# Patient Record
Sex: FEMALE | Race: WHITE | NOT HISPANIC OR LATINO | Employment: STUDENT | URBAN - METROPOLITAN AREA
[De-identification: names, ages, dates, MRNs, and addresses within clinical notes are randomized per-mention and may not be internally consistent; named-entity substitution may affect disease eponyms.]

---

## 2023-12-20 ENCOUNTER — OFFICE VISIT (OUTPATIENT)
Dept: PRIMARY CARE | Facility: CLINIC | Age: 18
End: 2023-12-20
Payer: MEDICAID

## 2023-12-20 VITALS
HEIGHT: 67 IN | TEMPERATURE: 97.3 F | SYSTOLIC BLOOD PRESSURE: 110 MMHG | DIASTOLIC BLOOD PRESSURE: 73 MMHG | OXYGEN SATURATION: 99 % | WEIGHT: 126 LBS | BODY MASS INDEX: 19.78 KG/M2 | HEART RATE: 87 BPM

## 2023-12-20 DIAGNOSIS — Z86.59 HISTORY OF DEPRESSION: ICD-10-CM

## 2023-12-20 DIAGNOSIS — F41.9 ANXIETY: ICD-10-CM

## 2023-12-20 DIAGNOSIS — R53.83 FATIGUE, UNSPECIFIED TYPE: ICD-10-CM

## 2023-12-20 DIAGNOSIS — R00.2 HEART PALPITATIONS: Primary | ICD-10-CM

## 2023-12-20 PROCEDURE — 99203 OFFICE O/P NEW LOW 30 MIN: CPT | Performed by: STUDENT IN AN ORGANIZED HEALTH CARE EDUCATION/TRAINING PROGRAM

## 2023-12-20 PROCEDURE — 4004F PT TOBACCO SCREEN RCVD TLK: CPT | Performed by: STUDENT IN AN ORGANIZED HEALTH CARE EDUCATION/TRAINING PROGRAM

## 2023-12-20 ASSESSMENT — ENCOUNTER SYMPTOMS
UNEXPECTED WEIGHT CHANGE: 0
SHORTNESS OF BREATH: 0
ABDOMINAL PAIN: 0
CONFUSION: 0
FEVER: 0
CHILLS: 0
FATIGUE: 1
CHEST TIGHTNESS: 1
DIARRHEA: 0
COLOR CHANGE: 0
NAUSEA: 0
VOMITING: 0
DIZZINESS: 0
COUGH: 0
WHEEZING: 0
PALPITATIONS: 1
CONSTIPATION: 0
MUSCULOSKELETAL NEGATIVE: 1
HEADACHES: 0

## 2023-12-20 NOTE — PROGRESS NOTES
"Subjective   Patient ID: Patricia Pérez is a 18 y.o. female who presents for New Patient Visit ([facility] ).    HPI   She is new pt here to [facility] and also with few concerns. She is a std at [employer] and also work at [employer]. Reports she is having fast heart beat usually when she is standing, some chest discomfort; reports fatigue to quickly; feels some dizziness as well x 1-2 mo. Denies having diarrhea, skin issues; denies fh/o thyroid disease; reports some h/o anxiety but not bad. Reports h/o depression & insomnia and prev taking trazodone & prozac; last taken few yrs ago, 2021. Reports she tried diff doses of prozac w/o much help.     Review of Systems   Constitutional:  Positive for fatigue. Negative for chills, fever and unexpected weight change.   HENT: Negative.     Respiratory:  Positive for chest tightness. Negative for cough, shortness of breath and wheezing.    Cardiovascular:  Positive for palpitations. Negative for chest pain.   Gastrointestinal:  Negative for abdominal pain, constipation, diarrhea, nausea and vomiting.   Musculoskeletal: Negative.    Skin:  Negative for color change and rash.   Neurological:  Negative for dizziness and headaches.   Psychiatric/Behavioral:  Negative for behavioral problems and confusion.        Objective   /73 (BP Location: Left arm, Patient Position: Sitting, BP Cuff Size: Small adult)   Pulse 87   Temp 36.3 °C (97.3 °F)   Ht 1.702 m (5' 7\")   Wt 57.2 kg (126 lb)   LMP 11/29/2023 (Approximate)   SpO2 99%   BMI 19.73 kg/m²     Physical Exam  Vitals and nursing note reviewed.   Constitutional:       Appearance: Normal appearance.   Eyes:      Extraocular Movements: Extraocular movements intact.      Pupils: Pupils are equal, round, and reactive to light.   Cardiovascular:      Rate and Rhythm: Normal rate and regular rhythm.      Pulses: Normal pulses.      Heart sounds: Normal heart sounds.   Pulmonary:      Effort: Pulmonary effort is normal. No " respiratory distress.      Breath sounds: Normal breath sounds.   Abdominal:      General: Abdomen is flat. Bowel sounds are normal.      Palpations: Abdomen is soft.   Musculoskeletal:         General: Normal range of motion.   Neurological:      General: No focal deficit present.      Mental Status: She is alert and oriented to person, place, and time.      Cranial Nerves: No cranial nerve deficit.      Sensory: No sensory deficit.      Motor: No weakness.   Psychiatric:         Mood and Affect: Mood normal.         Behavior: Behavior normal.       Assessment/Plan   She is new pt here to [facility] care and also with few concerns. She is a std at [employer] (w/ [employer]) and also work at [employer]. She is having intermittent palpitations, generalized fatigue on & off for over few months, could be anxiety related based on hx vs other as metabolic vs other as cardiac; will put an order for BW to eval metabolic and also order holter monitor to be obtained if BW comes normal. Adv home relaxation & mindfulness activities; may consider adding anxiety/depression meds as indicated at NOV. She is otherwise clinically & vitally stable. Seek ER care if sx worsens.   Problem List Items Addressed This Visit    None  Visit Diagnoses         Codes    Heart palpitations    -  Primary R00.2    Relevant Orders    Comprehensive Metabolic Panel    CBC and Auto Differential    Tsh With Reflex To Free T4 If Abnormal    Vitamin B12    Holter or Event Cardiac Monitor    Fatigue, unspecified type     R53.83    Relevant Orders    Comprehensive Metabolic Panel    CBC and Auto Differential    Tsh With Reflex To Free T4 If Abnormal    Vitamin B12    Holter or Event Cardiac Monitor    Anxiety     F41.9    History of depression     Z86.59          Rtc 2-3 mo for BIANCA Quiñones MD   [facility], Family Medicine

## 2023-12-21 ENCOUNTER — LAB (OUTPATIENT)
Dept: LAB | Facility: LAB | Age: 18
End: 2023-12-21
Payer: MEDICAID

## 2023-12-21 DIAGNOSIS — R53.83 FATIGUE, UNSPECIFIED TYPE: ICD-10-CM

## 2023-12-21 DIAGNOSIS — R00.2 HEART PALPITATIONS: ICD-10-CM

## 2023-12-21 LAB
ALBUMIN SERPL BCP-MCNC: 4.6 G/DL (ref 3.4–5)
ALP SERPL-CCNC: 64 U/L (ref 33–110)
ALT SERPL W P-5'-P-CCNC: 15 U/L (ref 7–45)
ANION GAP SERPL CALC-SCNC: 8 MMOL/L (ref 10–20)
AST SERPL W P-5'-P-CCNC: 16 U/L (ref 9–39)
BASOPHILS # BLD AUTO: 0.03 X10*3/UL (ref 0–0.1)
BASOPHILS NFR BLD AUTO: 0.2 %
BILIRUB SERPL-MCNC: 0.6 MG/DL (ref 0–1.2)
BUN SERPL-MCNC: 13 MG/DL (ref 6–23)
CALCIUM SERPL-MCNC: 9.5 MG/DL (ref 8.6–10.3)
CHLORIDE SERPL-SCNC: 103 MMOL/L (ref 98–107)
CO2 SERPL-SCNC: 30 MMOL/L (ref 21–32)
CREAT SERPL-MCNC: 0.6 MG/DL (ref 0.5–1.05)
EOSINOPHIL # BLD AUTO: 0.12 X10*3/UL (ref 0–0.7)
EOSINOPHIL NFR BLD AUTO: 0.9 %
ERYTHROCYTE [DISTWIDTH] IN BLOOD BY AUTOMATED COUNT: 12.4 % (ref 11.5–14.5)
GFR SERPL CREATININE-BSD FRML MDRD: >90 ML/MIN/1.73M*2
GLUCOSE SERPL-MCNC: 76 MG/DL (ref 74–99)
HCT VFR BLD AUTO: 44.2 % (ref 36–46)
HGB BLD-MCNC: 14.4 G/DL (ref 12–16)
IMM GRANULOCYTES # BLD AUTO: 0.03 X10*3/UL (ref 0–0.7)
IMM GRANULOCYTES NFR BLD AUTO: 0.2 % (ref 0–0.9)
LYMPHOCYTES # BLD AUTO: 1.63 X10*3/UL (ref 1.2–4.8)
LYMPHOCYTES NFR BLD AUTO: 12.5 %
MCH RBC QN AUTO: 30.6 PG (ref 26–34)
MCHC RBC AUTO-ENTMCNC: 32.6 G/DL (ref 32–36)
MCV RBC AUTO: 94 FL (ref 80–100)
MONOCYTES # BLD AUTO: 0.67 X10*3/UL (ref 0.1–1)
MONOCYTES NFR BLD AUTO: 5.1 %
NEUTROPHILS # BLD AUTO: 10.58 X10*3/UL (ref 1.2–7.7)
NEUTROPHILS NFR BLD AUTO: 81.1 %
NRBC BLD-RTO: 0 /100 WBCS (ref 0–0)
PLATELET # BLD AUTO: 273 X10*3/UL (ref 150–450)
POTASSIUM SERPL-SCNC: 4.3 MMOL/L (ref 3.5–5.3)
PROT SERPL-MCNC: 7 G/DL (ref 6.4–8.2)
RBC # BLD AUTO: 4.7 X10*6/UL (ref 4–5.2)
SODIUM SERPL-SCNC: 137 MMOL/L (ref 136–145)
TSH SERPL-ACNC: 0.91 MIU/L (ref 0.44–3.98)
VIT B12 SERPL-MCNC: 297 PG/ML (ref 211–911)
WBC # BLD AUTO: 13.1 X10*3/UL (ref 4.4–11.3)

## 2023-12-21 PROCEDURE — 80053 COMPREHEN METABOLIC PANEL: CPT

## 2023-12-21 PROCEDURE — 85025 COMPLETE CBC W/AUTO DIFF WBC: CPT

## 2023-12-21 PROCEDURE — 82607 VITAMIN B-12: CPT

## 2023-12-21 PROCEDURE — 36415 COLL VENOUS BLD VENIPUNCTURE: CPT

## 2023-12-21 PROCEDURE — 84443 ASSAY THYROID STIM HORMONE: CPT

## 2023-12-26 ENCOUNTER — TELEPHONE (OUTPATIENT)
Dept: PRIMARY CARE | Facility: CLINIC | Age: 18
End: 2023-12-26
Payer: MEDICAID

## 2023-12-26 DIAGNOSIS — D72.829 LEUKOCYTOSIS, UNSPECIFIED TYPE: Primary | ICD-10-CM

## 2024-01-08 ENCOUNTER — HOSPITAL ENCOUNTER (OUTPATIENT)
Dept: CARDIOLOGY | Facility: CLINIC | Age: 19
Discharge: HOME | End: 2024-01-08
Payer: MEDICAID

## 2024-01-08 DIAGNOSIS — R53.83 FATIGUE, UNSPECIFIED TYPE: ICD-10-CM

## 2024-01-08 DIAGNOSIS — R00.2 HEART PALPITATIONS: ICD-10-CM

## 2024-01-08 PROCEDURE — 93244 EXT ECG>48HR<7D REV&INTERPJ: CPT | Performed by: INTERNAL MEDICINE

## 2024-01-08 PROCEDURE — 93242 EXT ECG>48HR<7D RECORDING: CPT

## 2024-02-26 ENCOUNTER — OFFICE VISIT (OUTPATIENT)
Dept: PRIMARY CARE | Facility: CLINIC | Age: 19
End: 2024-02-26
Payer: MEDICAID

## 2024-02-26 VITALS
HEART RATE: 97 BPM | TEMPERATURE: 96.2 F | RESPIRATION RATE: 16 BRPM | HEIGHT: 67 IN | BODY MASS INDEX: 19.93 KG/M2 | DIASTOLIC BLOOD PRESSURE: 68 MMHG | SYSTOLIC BLOOD PRESSURE: 105 MMHG | WEIGHT: 127 LBS | OXYGEN SATURATION: 99 %

## 2024-02-26 DIAGNOSIS — R42 INTERMITTENT LIGHTHEADEDNESS: ICD-10-CM

## 2024-02-26 DIAGNOSIS — R00.2 HEART PALPITATIONS: Primary | ICD-10-CM

## 2024-02-26 PROCEDURE — 99213 OFFICE O/P EST LOW 20 MIN: CPT | Performed by: STUDENT IN AN ORGANIZED HEALTH CARE EDUCATION/TRAINING PROGRAM

## 2024-02-26 SDOH — ECONOMIC STABILITY: FOOD INSECURITY: WITHIN THE PAST 12 MONTHS, THE FOOD YOU BOUGHT JUST DIDN'T LAST AND YOU DIDN'T HAVE MONEY TO GET MORE.: NEVER TRUE

## 2024-02-26 SDOH — ECONOMIC STABILITY: FOOD INSECURITY: WITHIN THE PAST 12 MONTHS, YOU WORRIED THAT YOUR FOOD WOULD RUN OUT BEFORE YOU GOT MONEY TO BUY MORE.: NEVER TRUE

## 2024-02-26 ASSESSMENT — ENCOUNTER SYMPTOMS
DIZZINESS: 0
CONSTIPATION: 0
COLOR CHANGE: 0
CONFUSION: 0
PALPITATIONS: 1
SHORTNESS OF BREATH: 0
COUGH: 0
WHEEZING: 0
NAUSEA: 0
ABDOMINAL PAIN: 0
FEVER: 0
CHILLS: 0
HEADACHES: 0
VOMITING: 0
UNEXPECTED WEIGHT CHANGE: 0
MUSCULOSKELETAL NEGATIVE: 1
FATIGUE: 0
DIARRHEA: 0

## 2024-02-26 ASSESSMENT — PATIENT HEALTH QUESTIONNAIRE - PHQ9
SUM OF ALL RESPONSES TO PHQ9 QUESTIONS 1 & 2: 2
10. IF YOU CHECKED OFF ANY PROBLEMS, HOW DIFFICULT HAVE THESE PROBLEMS MADE IT FOR YOU TO DO YOUR WORK, TAKE CARE OF THINGS AT HOME, OR GET ALONG WITH OTHER PEOPLE: SOMEWHAT DIFFICULT
2. FEELING DOWN, DEPRESSED OR HOPELESS: SEVERAL DAYS
1. LITTLE INTEREST OR PLEASURE IN DOING THINGS: SEVERAL DAYS

## 2024-02-26 ASSESSMENT — LIFESTYLE VARIABLES
HOW OFTEN DO YOU HAVE A DRINK CONTAINING ALCOHOL: MONTHLY OR LESS
SKIP TO QUESTIONS 9-10: 1
AUDIT-C TOTAL SCORE: 1
HOW OFTEN DO YOU HAVE SIX OR MORE DRINKS ON ONE OCCASION: NEVER
HOW MANY STANDARD DRINKS CONTAINING ALCOHOL DO YOU HAVE ON A TYPICAL DAY: 1 OR 2

## 2024-02-26 NOTE — PROGRESS NOTES
"Subjective   Patient ID: Patricia Pérez is a 18 y.o. female who presents for Follow-up (Patient is here for a follow up.  Patient has no new concerns this visit.).    HPI   She is here for FU visit. She is a std at [facility] [employer] [facility] and also work at [employer]. Reports she cont to have palpitation and lightheadedness. She had bld work with normal result(12/21/23). Also had holter (01/8/24) with pretty normal result.     Review of Systems   Constitutional:  Negative for chills, fatigue, fever and unexpected weight change.   HENT: Negative.     Respiratory:  Negative for cough, shortness of breath and wheezing.    Cardiovascular:  Positive for palpitations. Negative for chest pain and leg swelling.   Gastrointestinal:  Negative for abdominal pain, constipation, diarrhea, nausea and vomiting.   Musculoskeletal: Negative.    Skin:  Negative for color change and rash.   Neurological:  Negative for dizziness and headaches.   Psychiatric/Behavioral:  Negative for behavioral problems and confusion.        Objective   /68 (BP Location: Left arm, Patient Position: Sitting, BP Cuff Size: Adult)   Pulse 97   Temp 35.7 °C (96.2 °F) (Temporal)   Resp 16   Ht 1.702 m (5' 7\")   Wt 57.6 kg (127 lb)   SpO2 99%   BMI 19.89 kg/m²     Physical Exam  Vitals and nursing note reviewed.   Constitutional:       Appearance: Normal appearance.   Cardiovascular:      Rate and Rhythm: Normal rate and regular rhythm.      Pulses: Normal pulses.      Heart sounds: Normal heart sounds.   Pulmonary:      Effort: Pulmonary effort is normal. No respiratory distress.      Breath sounds: Normal breath sounds.   Abdominal:      General: Abdomen is flat. Bowel sounds are normal.      Palpations: Abdomen is soft.   Musculoskeletal:         General: Normal range of motion.   Neurological:      General: No focal deficit present.      Mental Status: She is alert and oriented to person, place, and time.   Psychiatric:         Mood and Affect: Mood normal.       "   Behavior: Behavior normal.       Assessment/Plan   She is here for FU visit. She cont to have palpitation and some lightheadedness, therefore will put referral to see cards for further mgmt. Call us or seek ER care if sx worsens.   Problem List Items Addressed This Visit    None  Visit Diagnoses         Codes    Heart palpitations    -  Primary R00.2    Relevant Orders    Referral to Cardiology    Intermittent lightheadedness     R42    Relevant Orders    Referral to Cardiology           Patient was identified as a fall risk. Risk prevention instructions provided.    Rtc 3-4 [facility]/FU   Alpesh Quiñones MD    [facility], Family Medicine

## 2024-02-26 NOTE — PATIENT INSTRUCTIONS

## 2024-05-29 ENCOUNTER — OFFICE VISIT (OUTPATIENT)
Dept: CARDIOLOGY | Facility: CLINIC | Age: 19
End: 2024-05-29
Payer: MEDICAID

## 2024-05-29 VITALS
DIASTOLIC BLOOD PRESSURE: 64 MMHG | BODY MASS INDEX: 20.33 KG/M2 | WEIGHT: 129.5 LBS | HEIGHT: 67 IN | HEART RATE: 111 BPM | SYSTOLIC BLOOD PRESSURE: 108 MMHG

## 2024-05-29 DIAGNOSIS — R00.2 HEART PALPITATIONS: ICD-10-CM

## 2024-05-29 DIAGNOSIS — R55 SYNCOPE AND COLLAPSE: ICD-10-CM

## 2024-05-29 DIAGNOSIS — R00.0 TACHYCARDIA: Primary | ICD-10-CM

## 2024-05-29 DIAGNOSIS — R42 INTERMITTENT LIGHTHEADEDNESS: ICD-10-CM

## 2024-05-29 PROCEDURE — 99214 OFFICE O/P EST MOD 30 MIN: CPT | Performed by: NURSE PRACTITIONER

## 2024-05-29 PROCEDURE — 99204 OFFICE O/P NEW MOD 45 MIN: CPT | Performed by: NURSE PRACTITIONER

## 2024-05-29 RX ORDER — METOPROLOL SUCCINATE 25 MG/1
25 TABLET, EXTENDED RELEASE ORAL DAILY
Qty: 30 TABLET | Refills: 0 | Status: SHIPPED | OUTPATIENT
Start: 2024-05-29 | End: 2025-05-29

## 2024-05-29 NOTE — PROGRESS NOTES
Chief Complaint:   Palpitations and lightheadedness      History Of Present Illness:    Patricia Pérez is a 18 y.o. female here with palpitations and lightheadedness.    Patient has had many of these symptoms for awhile. Such as with positions changes her vision will go black and will be almost syncopal. One episode occurred 2 weeks ago when she was at the [employer]. Was standing and her vision went completely black. Her hearing decreased and became diaphoretic. Urgently sat down. Had some lemonade and french fries, symptoms resolved.     She was ill in December with a cold. Noticed a drastic increase in palpitations. She was having 8-10 a minute. They have since decreased in frequency but not resolved. She wore a 1 week heart monitor which was negative for acute findings.     She reports random increases in heart rates. They can occur with position changes and at times at rest. This is accompanied by SOB.     C/O Brain fog.     Has some blood pooling in legs when standing for awhile. Leg will get hot, itcy, and blotchy red. Occurs in shower as well.     Difficulty sleeping, always have a difficulty sleeping. Has tried melatonin. Always waking up, sometimes multiple times in an hour.     No significant cardiac family hx.     Hx of depression, not on treatment for.   Past Medical History:  She has a past medical history of Allergic and Depression (2018).    Past Surgical History:  She has no past surgical history on file.      Social History:  She reports that she has quit smoking. Her smoking use included cigarettes. She has never used smokeless tobacco. She reports current alcohol use of about 2.0 standard drinks of alcohol per week. She reports that she does not use drugs.    Family History:  Family History   Problem Relation Name Age of Onset    Depression Mother Kayla     Mental illness Mother Kayla     Depression Father Fabio         Allergies:  Penicillins    Review of Systems  All pertinent systems have been  "reviewed and are negative except for what is stated in the history of present illness.    All other systems have been reviewed and are negative and noncontributory to this patient's current ailments.     Visit Vitals  /64 (BP Location: Right arm, Patient Position: Sitting, BP Cuff Size: Adult)   Pulse (!) 111   Ht 1.702 m (5' 7\")   Wt 58.7 kg (129 lb 8 oz)   BMI 20.28 kg/m²   OB Status Having periods   Smoking Status Some Days   BSA 1.67 m²         Last Labs:  CBC -  Lab Results   Component Value Date    WBC 13.1 (H) 12/21/2023    HGB 14.4 12/21/2023    HCT 44.2 12/21/2023    MCV 94 12/21/2023     12/21/2023       CMP -  Lab Results   Component Value Date    CALCIUM 9.5 12/21/2023    PROT 7.0 12/21/2023    ALBUMIN 4.6 12/21/2023    AST 16 12/21/2023    ALT 15 12/21/2023    ALKPHOS 64 12/21/2023    BILITOT 0.6 12/21/2023    BUN 13 12/21/2023    CREATININE 0.60 12/21/2023       LIPID PANEL -   No results found for: \"CHOL\", \"TRIG\", \"HDL\", \"CHHDL\", \"LDLF\", \"VLDL\", \"NHDL\"    RENAL FUNCTION PANEL -   Lab Results   Component Value Date    GLUCOSE 76 12/21/2023     12/21/2023    K 4.3 12/21/2023     12/21/2023    CO2 30 12/21/2023    ANIONGAP 8 (L) 12/21/2023    BUN 13 12/21/2023    CREATININE 0.60 12/21/2023    CALCIUM 9.5 12/21/2023    ALBUMIN 4.6 12/21/2023        No results found for: \"BNP\", \"HGBA1C\"    Objective   Vitals reviewed.   Constitutional:       Appearance: Healthy appearance. Not in distress.   Eyes:      Conjunctiva/sclera: Conjunctivae normal.   Neck:      Vascular: No JVR. JVD normal.   Pulmonary:      Effort: Pulmonary effort is normal.      Breath sounds: Normal breath sounds. No wheezing. No rhonchi. No rales.   Chest:      Chest wall: Not tender to palpatation.   Cardiovascular:      PMI at left midclavicular line. Normal rate. Regular rhythm. Normal S1. Normal S2.       Murmurs: There is no murmur.      No gallop.  No click. No rub.   Edema:     Peripheral edema absent. "   Abdominal:      General: Bowel sounds are normal.      Palpations: Abdomen is soft.      Tenderness: There is no abdominal tenderness.   Musculoskeletal: Normal range of motion.         General: No tenderness. Skin:     General: Skin is warm and dry.   Neurological:      General: No focal deficit present.      Mental Status: Alert and oriented to person, place and time.   Psychiatric:         Attention and Perception: Attention normal.         Mood and Affect: Mood normal.       Assessment/Plan   Diagnoses and all orders for this visit:  Tachycardia  - Orthos negative for POTs   - can always pursue tilt table test of necessary   - starting metoprolol succinate XL (Toprol XL) 25 mg 24 hr tablet; Take 1 tablet (25 mg) by mouth once daily. Do not crush or chew.  - encouraged hydration and increase in dietary sodium   - discussed stress relieving techniques   Heart palpitations  - starting metoprolol  - nothing untoward on monitor   - discussed hydration and sodium   Intermittent lightheadedness  - discussed hydration and compression socks   Syncope and collapse  - Will check transthoracic echo (TTE) complete at future visit   - see above    Follow up in 3 weeks     Current Outpatient Medications:     metoprolol succinate XL (Toprol XL) 25 mg 24 hr tablet, Take 1 tablet (25 mg) by mouth once daily. Do not crush or chew., Disp: 30 tablet, Rfl: 0    Exclusive of any other services or procedures performed, INaye, spent 45 minutes in duration for this visit today.  This time consisted of chart review, obtaining history, and/or performing the exam as documented above, as well as, documenting the clinical information for the encounter in the electronic record, discussing treatment options, plans, and/or goals with patient, family, and/or caregiver, refilling medications, updating the electronic record, ordering medicines, lab work, imaging, referrals, and/or procedures as documented above and  communicating with other [facility] professionals. I have discussed the results of laboratory, radiology, and cardiology studies with the patient and their family/caregiver.

## 2024-06-18 ENCOUNTER — APPOINTMENT (OUTPATIENT)
Dept: PRIMARY CARE | Facility: CLINIC | Age: 19
End: 2024-06-18
Payer: MEDICAID

## 2024-06-18 VITALS
HEIGHT: 67 IN | OXYGEN SATURATION: 98 % | BODY MASS INDEX: 20.72 KG/M2 | DIASTOLIC BLOOD PRESSURE: 61 MMHG | WEIGHT: 132 LBS | RESPIRATION RATE: 16 BRPM | TEMPERATURE: 97.3 F | HEART RATE: 74 BPM | SYSTOLIC BLOOD PRESSURE: 97 MMHG

## 2024-06-18 DIAGNOSIS — R00.2 HEART PALPITATIONS: ICD-10-CM

## 2024-06-18 DIAGNOSIS — F41.8 DEPRESSION WITH ANXIETY: Primary | ICD-10-CM

## 2024-06-18 DIAGNOSIS — F41.9 ANXIETY: ICD-10-CM

## 2024-06-18 PROCEDURE — 99213 OFFICE O/P EST LOW 20 MIN: CPT | Performed by: STUDENT IN AN ORGANIZED HEALTH CARE EDUCATION/TRAINING PROGRAM

## 2024-06-18 RX ORDER — ESCITALOPRAM OXALATE 5 MG/1
5 TABLET ORAL DAILY
Qty: 30 TABLET | Refills: 2 | Status: SHIPPED | OUTPATIENT
Start: 2024-06-18 | End: 2024-09-16

## 2024-06-18 SDOH — ECONOMIC STABILITY: FOOD INSECURITY: WITHIN THE PAST 12 MONTHS, YOU WORRIED THAT YOUR FOOD WOULD RUN OUT BEFORE YOU GOT MONEY TO BUY MORE.: NEVER TRUE

## 2024-06-18 SDOH — ECONOMIC STABILITY: FOOD INSECURITY: WITHIN THE PAST 12 MONTHS, THE FOOD YOU BOUGHT JUST DIDN'T LAST AND YOU DIDN'T HAVE MONEY TO GET MORE.: NEVER TRUE

## 2024-06-18 ASSESSMENT — ENCOUNTER SYMPTOMS
FATIGUE: 0
DIZZINESS: 0
HEADACHES: 0
COUGH: 0
COLOR CHANGE: 0
CHILLS: 0
PALPITATIONS: 0
MUSCULOSKELETAL NEGATIVE: 1
DIARRHEA: 0
VOMITING: 0
ABDOMINAL PAIN: 0
UNEXPECTED WEIGHT CHANGE: 0
WHEEZING: 0
SHORTNESS OF BREATH: 0
CONSTIPATION: 0
FEVER: 0
NAUSEA: 0
CONFUSION: 0

## 2024-06-18 ASSESSMENT — LIFESTYLE VARIABLES
HOW OFTEN DO YOU HAVE A DRINK CONTAINING ALCOHOL: MONTHLY OR LESS
AUDIT-C TOTAL SCORE: 1
SKIP TO QUESTIONS 9-10: 1
HOW MANY STANDARD DRINKS CONTAINING ALCOHOL DO YOU HAVE ON A TYPICAL DAY: 1 OR 2
HOW OFTEN DO YOU HAVE SIX OR MORE DRINKS ON ONE OCCASION: NEVER

## 2024-06-18 ASSESSMENT — PATIENT HEALTH QUESTIONNAIRE - PHQ9
SUM OF ALL RESPONSES TO PHQ9 QUESTIONS 1 & 2: 2
10. IF YOU CHECKED OFF ANY PROBLEMS, HOW DIFFICULT HAVE THESE PROBLEMS MADE IT FOR YOU TO DO YOUR WORK, TAKE CARE OF THINGS AT HOME, OR GET ALONG WITH OTHER PEOPLE: VERY DIFFICULT
2. FEELING DOWN, DEPRESSED OR HOPELESS: SEVERAL DAYS
1. LITTLE INTEREST OR PLEASURE IN DOING THINGS: SEVERAL DAYS

## 2024-06-18 NOTE — PROGRESS NOTES
"Subjective   Patient ID: Patricia Pérez is a 18 y.o. female who presents for Follow-up (4 month follow up).    HPI   She is here for FU visit. She saw cards as referred for palpitation & lightheadedness, was staretd on metoprolol 25 mg daily. Also has an order for echo. Reports she is doing okay on BB, no more palpitation however feels little low on energy/fatigue but helps with sleep, so overall happy on the meds for now. Has appt with cards tmr.   Reports her anxiety sl better on BB but depressive sx about same or sl worse on energy level, wants to try SSRI if possible. Prev tried fluoxetine w.o much help.  Also reports occasional dissociative symptoms for long time.  Denies SI/HI and never been hosp for any psych illness. She is sexually active with female partner.     Review of Systems   Constitutional:  Negative for chills, fatigue, fever and unexpected weight change.   HENT: Negative.     Respiratory:  Negative for cough, shortness of breath and wheezing.    Cardiovascular:  Negative for chest pain, palpitations and leg swelling.   Gastrointestinal:  Negative for abdominal pain, constipation, diarrhea, nausea and vomiting.   Musculoskeletal: Negative.    Skin:  Negative for color change and rash.   Neurological:  Negative for dizziness and headaches.   Psychiatric/Behavioral:  Negative for behavioral problems and confusion.        Objective   BP 97/61 (BP Location: Right arm, Patient Position: Sitting, BP Cuff Size: Adult)   Pulse 74   Temp 36.3 °C (97.3 °F) (Temporal)   Resp 16   Ht 1.702 m (5' 7\")   Wt 59.9 kg (132 lb)   SpO2 98%   BMI 20.67 kg/m²     Physical Exam  Vitals and nursing note reviewed.   Constitutional:       Appearance: Normal appearance.      Comments: Well-appearing young female with a multiple piercing on her face/lips   Cardiovascular:      Rate and Rhythm: Normal rate and regular rhythm.      Pulses: Normal pulses.      Heart sounds: Normal heart sounds.   Pulmonary:      Effort: " Pulmonary effort is normal. No respiratory distress.      Breath sounds: Normal breath sounds.   Abdominal:      General: Abdomen is flat. Bowel sounds are normal.      Palpations: Abdomen is soft.   Musculoskeletal:         General: Normal range of motion.   Neurological:      General: No focal deficit present.      Mental Status: She is alert and oriented to person, place, and time.   Psychiatric:         Mood and Affect: Mood normal.         Behavior: Behavior normal.         Thought Content: Thought content does not include homicidal or suicidal ideation.       Assessment/Plan   She is here for follow-up visit.  Appears her palpitation better on metoprolol as prescribed by cards, continue same and follow-up with cardiology team as a scheduled.  Has ongoing chronic depression, would likely benefit from SSRIs, start Lexapro 5 mg daily as below.  And may help with her dissociative symptoms as well.  Discussed possible SE from meds.  Recommend home relaxation and mindfulness activities daily.      Problem List Items Addressed This Visit    None  Visit Diagnoses         Codes    Depression with anxiety    -  Primary F41.8    Relevant Medications    escitalopram (Lexapro) 5 mg tablet    Heart palpitations     R00.2    Anxiety     F41.9          Rtc 4-6 wks for BIANCA Quiñones MD    [facility]

## 2024-06-19 ENCOUNTER — OFFICE VISIT (OUTPATIENT)
Dept: CARDIOLOGY | Facility: CLINIC | Age: 19
End: 2024-06-19
Payer: MEDICAID

## 2024-06-19 VITALS
HEIGHT: 67 IN | SYSTOLIC BLOOD PRESSURE: 101 MMHG | DIASTOLIC BLOOD PRESSURE: 56 MMHG | BODY MASS INDEX: 20.88 KG/M2 | WEIGHT: 133 LBS | HEART RATE: 87 BPM

## 2024-06-19 DIAGNOSIS — R00.2 HEART PALPITATIONS: ICD-10-CM

## 2024-06-19 DIAGNOSIS — R00.0 TACHYCARDIA: Primary | ICD-10-CM

## 2024-06-19 DIAGNOSIS — R55 SYNCOPE AND COLLAPSE: ICD-10-CM

## 2024-06-19 DIAGNOSIS — R42 INTERMITTENT LIGHTHEADEDNESS: ICD-10-CM

## 2024-06-19 PROCEDURE — 99213 OFFICE O/P EST LOW 20 MIN: CPT | Performed by: NURSE PRACTITIONER

## 2024-06-19 RX ORDER — METOPROLOL SUCCINATE 25 MG/1
25 TABLET, EXTENDED RELEASE ORAL DAILY
Qty: 90 TABLET | Refills: 3 | Status: SHIPPED | OUTPATIENT
Start: 2024-06-19 | End: 2025-06-19

## 2024-06-19 NOTE — PROGRESS NOTES
"Chief Complaint:   Palpitations and lightheadedness      History Of Present Illness:    Patricia Pérez is a 18 y.o. female here with palpitations and lightheadedness. Started on metoprolol last visit. She is tolerating.   HR has improved. Still has some skipped beats  Feels less anxious.   Sleeping better.   Positional near syncope continues but may have been dehydrated.   Pooling in legs improved.  Has not been syncopal.     Past Medical History:  She has a past medical history of Allergic and Depression (2018).    Past Surgical History:  She has no past surgical history on file.      Social History:  She reports that she has quit smoking. Her smoking use included cigarettes. She has never used smokeless tobacco. She reports current alcohol use of about 2.0 standard drinks of alcohol per week. She reports that she does not use drugs.    Family History:  Family History   Problem Relation Name Age of Onset    Depression Mother Kayla     Mental illness Mother Kayla     Depression Father Fabio         Allergies:  Penicillins    Review of Systems  All pertinent systems have been reviewed and are negative except for what is stated in the history of present illness.    All other systems have been reviewed and are negative and noncontributory to this patient's current ailments.     Visit Vitals  /56 (BP Location: Right arm, Patient Position: Sitting, BP Cuff Size: Adult)   Pulse 87   Ht 1.702 m (5' 7\")   Wt 60.3 kg (133 lb)   BMI 20.83 kg/m²   OB Status Having periods   Smoking Status Former   BSA 1.69 m²     Last Labs:  CBC -  Lab Results   Component Value Date    WBC 13.1 (H) 12/21/2023    HGB 14.4 12/21/2023    HCT 44.2 12/21/2023    MCV 94 12/21/2023     12/21/2023       CMP -  Lab Results   Component Value Date    CALCIUM 9.5 12/21/2023    PROT 7.0 12/21/2023    ALBUMIN 4.6 12/21/2023    AST 16 12/21/2023    ALT 15 12/21/2023    ALKPHOS 64 12/21/2023    BILITOT 0.6 12/21/2023    BUN 13 12/21/2023    " "CREATININE 0.60 12/21/2023       LIPID PANEL -   No results found for: \"CHOL\", \"TRIG\", \"HDL\", \"CHHDL\", \"LDLF\", \"VLDL\", \"NHDL\"    RENAL FUNCTION PANEL -   Lab Results   Component Value Date    GLUCOSE 76 12/21/2023     12/21/2023    K 4.3 12/21/2023     12/21/2023    CO2 30 12/21/2023    ANIONGAP 8 (L) 12/21/2023    BUN 13 12/21/2023    CREATININE 0.60 12/21/2023    CALCIUM 9.5 12/21/2023    ALBUMIN 4.6 12/21/2023        No results found for: \"BNP\", \"HGBA1C\"    Objective   Vitals reviewed.   Constitutional:       Appearance: Healthy appearance. Not in distress.   Eyes:      Conjunctiva/sclera: Conjunctivae normal.   Neck:      Vascular: No JVR. JVD normal.   Pulmonary:      Effort: Pulmonary effort is normal.      Breath sounds: Normal breath sounds. No wheezing. No rhonchi. No rales.   Chest:      Chest wall: Not tender to palpatation.   Cardiovascular:      PMI at left midclavicular line. Normal rate. Regular rhythm. Normal S1. Normal S2.       Murmurs: There is no murmur.      No gallop.  No click. No rub.   Edema:     Peripheral edema absent.   Abdominal:      General: Bowel sounds are normal.      Palpations: Abdomen is soft.      Tenderness: There is no abdominal tenderness.   Musculoskeletal: Normal range of motion.         General: No tenderness. Skin:     General: Skin is warm and dry.   Neurological:      General: No focal deficit present.      Mental Status: Alert and oriented to person, place and time.   Psychiatric:         Attention and Perception: Attention normal.         Mood and Affect: Mood normal.       Assessment/Plan   Diagnoses and all orders for this visit:  Tachycardia  - Orthos negative for POTs   - can always pursue tilt table test of necessary   - tolerating metoprolol succinate XL (Toprol XL) 25 mg 24 hr tablet; Take 1 tablet (25 mg) by mouth once daily. Do not crush or chew.  - heart rate well controlled, improved with metoprolol  Heart palpitations  - improved  - nothing " untoward on monitor   - continue increased hydration and sodium   Intermittent lightheadedness  - discussed hydration   Syncope and collapse  - improved    Follow up in 3 months    Current Outpatient Medications:     escitalopram (Lexapro) 5 mg tablet, Take 1 tablet (5 mg) by mouth once daily., Disp: 30 tablet, Rfl: 2    metoprolol succinate XL (Toprol XL) 25 mg 24 hr tablet, Take 1 tablet (25 mg) by mouth once daily. Do not crush or chew., Disp: 90 tablet, Rfl: 3    Exclusive of any other services or procedures performed, I, Naye WADE, spent 45 minutes in duration for this visit today.  This time consisted of chart review, obtaining history, and/or performing the exam as documented above, as well as, documenting the clinical information for the encounter in the electronic record, discussing treatment options, plans, and/or goals with patient, family, and/or caregiver, refilling medications, updating the electronic record, ordering medicines, lab work, imaging, referrals, and/or procedures as documented above and communicating [facility] other [facility]. I have discussed the results of laboratory, radiology, and cardiology studies with the patient and their family/caregiver.

## 2024-07-24 ENCOUNTER — APPOINTMENT (OUTPATIENT)
Dept: PRIMARY CARE | Facility: CLINIC | Age: 19
End: 2024-07-24
Payer: MEDICAID

## 2024-07-24 VITALS
SYSTOLIC BLOOD PRESSURE: 110 MMHG | OXYGEN SATURATION: 99 % | RESPIRATION RATE: 16 BRPM | DIASTOLIC BLOOD PRESSURE: 71 MMHG | BODY MASS INDEX: 20.88 KG/M2 | HEIGHT: 67 IN | HEART RATE: 87 BPM | WEIGHT: 133 LBS | TEMPERATURE: 97.9 F

## 2024-07-24 DIAGNOSIS — F41.8 DEPRESSION WITH ANXIETY: ICD-10-CM

## 2024-07-24 PROCEDURE — 3008F BODY MASS INDEX DOCD: CPT | Performed by: STUDENT IN AN ORGANIZED HEALTH CARE EDUCATION/TRAINING PROGRAM

## 2024-07-24 PROCEDURE — 99213 OFFICE O/P EST LOW 20 MIN: CPT | Performed by: STUDENT IN AN ORGANIZED HEALTH CARE EDUCATION/TRAINING PROGRAM

## 2024-07-24 RX ORDER — ESCITALOPRAM OXALATE 5 MG/1
5 TABLET ORAL DAILY
Qty: 30 TABLET | Refills: 2 | Status: SHIPPED | OUTPATIENT
Start: 2024-07-24 | End: 2024-10-22

## 2024-07-24 SDOH — ECONOMIC STABILITY: FOOD INSECURITY: WITHIN THE PAST 12 MONTHS, YOU WORRIED THAT YOUR FOOD WOULD RUN OUT BEFORE YOU GOT MONEY TO BUY MORE.: NEVER TRUE

## 2024-07-24 SDOH — ECONOMIC STABILITY: FOOD INSECURITY: WITHIN THE PAST 12 MONTHS, THE FOOD YOU BOUGHT JUST DIDN'T LAST AND YOU DIDN'T HAVE MONEY TO GET MORE.: NEVER TRUE

## 2024-07-24 ASSESSMENT — PATIENT HEALTH QUESTIONNAIRE - PHQ9
5. POOR APPETITE OR OVEREATING: SEVERAL DAYS
2. FEELING DOWN, DEPRESSED OR HOPELESS: SEVERAL DAYS
SUM OF ALL RESPONSES TO PHQ9 QUESTIONS 1 & 2: 3
SUM OF ALL RESPONSES TO PHQ QUESTIONS 1-9: 11
10. IF YOU CHECKED OFF ANY PROBLEMS, HOW DIFFICULT HAVE THESE PROBLEMS MADE IT FOR YOU TO DO YOUR WORK, TAKE CARE OF THINGS AT HOME, OR GET ALONG WITH OTHER PEOPLE: SOMEWHAT DIFFICULT
7. TROUBLE CONCENTRATING ON THINGS, SUCH AS READING THE NEWSPAPER OR WATCHING TELEVISION: NEARLY EVERY DAY
3. TROUBLE FALLING OR STAYING ASLEEP: SEVERAL DAYS
1. LITTLE INTEREST OR PLEASURE IN DOING THINGS: MORE THAN HALF THE DAYS
8. MOVING OR SPEAKING SO SLOWLY THAT OTHER PEOPLE COULD HAVE NOTICED. OR THE OPPOSITE, BEING SO FIGETY OR RESTLESS THAT YOU HAVE BEEN MOVING AROUND A LOT MORE THAN USUAL: NOT AT ALL
6. FEELING BAD ABOUT YOURSELF - OR THAT YOU ARE A FAILURE OR HAVE LET YOURSELF OR YOUR FAMILY DOWN: NOT AT ALL
4. FEELING TIRED OR HAVING LITTLE ENERGY: NEARLY EVERY DAY
9. THOUGHTS THAT YOU WOULD BE BETTER OFF DEAD, OR OF HURTING YOURSELF: NOT AT ALL

## 2024-07-24 ASSESSMENT — ENCOUNTER SYMPTOMS
ABDOMINAL PAIN: 0
NERVOUS/ANXIOUS: 0
UNEXPECTED WEIGHT CHANGE: 0
CONFUSION: 0
FATIGUE: 0
HEADACHES: 0
COUGH: 0
DIZZINESS: 0
NAUSEA: 0
CHILLS: 0
COLOR CHANGE: 0
PALPITATIONS: 0
VOMITING: 0
SHORTNESS OF BREATH: 0
CONSTIPATION: 0
FEVER: 0
MUSCULOSKELETAL NEGATIVE: 1
DIARRHEA: 0
WHEEZING: 0

## 2024-07-24 ASSESSMENT — LIFESTYLE VARIABLES
HOW MANY STANDARD DRINKS CONTAINING ALCOHOL DO YOU HAVE ON A TYPICAL DAY: 1 OR 2
HOW OFTEN DO YOU HAVE SIX OR MORE DRINKS ON ONE OCCASION: NEVER
SKIP TO QUESTIONS 9-10: 1
HOW OFTEN DO YOU HAVE A DRINK CONTAINING ALCOHOL: MONTHLY OR LESS
AUDIT-C TOTAL SCORE: 1

## 2024-07-24 NOTE — PROGRESS NOTES
"Subjective   Patient ID: Patricia Pérez is a 18 y.o. female who presents for Follow-up (Depression and anxiety f/u).    HPI   She is here for follow-up visit. Reports she is feeling about the same, she was started on lexapro 5 mg at LOV; not sure if its helping but its not worse as well; reports some nausea from the meds but not bad, would like to cont same. Denies SI/HI and or panic attacks. She is taking metoprolol daily, its helping with her palpitation & anxiety as well. Her IO /71 with HR 87.     Review of Systems   Constitutional:  Negative for chills, fatigue, fever and unexpected weight change.   HENT: Negative.     Respiratory:  Negative for cough, shortness of breath and wheezing.    Cardiovascular:  Negative for chest pain, palpitations and leg swelling.   Gastrointestinal:  Negative for abdominal pain, constipation, diarrhea, nausea and vomiting.   Musculoskeletal: Negative.    Skin:  Negative for color change and rash.   Neurological:  Negative for dizziness and headaches.   Psychiatric/Behavioral:  Negative for behavioral problems, confusion and suicidal ideas. The patient is not nervous/anxious.        Objective   /71 (BP Location: Right arm, Patient Position: Sitting, BP Cuff Size: Adult)   Pulse 87   Temp 36.6 °C (97.9 °F)   Resp 16   Ht 1.702 m (5' 7\")   Wt 60.3 kg (133 lb)   SpO2 99%   BMI 20.83 kg/m²     Physical Exam  Vitals and nursing note reviewed.   Constitutional:       Appearance: Normal appearance.   Cardiovascular:      Rate and Rhythm: Normal rate and regular rhythm.      Pulses: Normal pulses.      Heart sounds: Normal heart sounds.   Pulmonary:      Effort: Pulmonary effort is normal.      Breath sounds: Normal breath sounds.   Abdominal:      General: Abdomen is flat. Bowel sounds are normal.      Palpations: Abdomen is soft.   Musculoskeletal:         General: Normal range of motion.   Neurological:      General: No focal deficit present.      Mental Status: She is " alert.   Psychiatric:         Mood and Affect: Mood normal.         Behavior: Behavior normal.       Assessment/Plan   She is here for FU visit. Appears she is doing okay, lexapro 5 mg keep her mood stable, will cont same as requested, avoid inc dose d/t possible SE as nausea. We may consider inc dose or switching to diff meds at NOV as indicated. Cont home relaxation & mindfulness activities. Cont BB as rx'd by cards.   Problem List Items Addressed This Visit    None  Visit Diagnoses         Codes    Depression with anxiety     F41.8    Relevant Medications    escitalopram (Lexapro) 5 mg tablet          Rtc 2 mo for FU   Alpesh Quiñones MD    [facility]

## 2024-09-19 ENCOUNTER — APPOINTMENT (OUTPATIENT)
Dept: CARDIOLOGY | Facility: CLINIC | Age: 19
End: 2024-09-19
Payer: MEDICAID

## 2024-09-23 ENCOUNTER — OFFICE VISIT (OUTPATIENT)
Dept: CARDIOLOGY | Facility: CLINIC | Age: 19
End: 2024-09-23
Payer: MEDICAID

## 2024-09-23 VITALS
WEIGHT: 127 LBS | HEART RATE: 91 BPM | BODY MASS INDEX: 19.89 KG/M2 | DIASTOLIC BLOOD PRESSURE: 53 MMHG | SYSTOLIC BLOOD PRESSURE: 95 MMHG

## 2024-09-23 DIAGNOSIS — R42 INTERMITTENT LIGHTHEADEDNESS: ICD-10-CM

## 2024-09-23 DIAGNOSIS — R00.2 HEART PALPITATIONS: ICD-10-CM

## 2024-09-23 DIAGNOSIS — R00.0 TACHYCARDIA: Primary | ICD-10-CM

## 2024-09-23 DIAGNOSIS — R55 SYNCOPE AND COLLAPSE: ICD-10-CM

## 2024-09-23 PROCEDURE — 99213 OFFICE O/P EST LOW 20 MIN: CPT | Performed by: NURSE PRACTITIONER

## 2024-09-23 NOTE — PROGRESS NOTES
"Chief Complaint:   Palpitations and lightheadedness      History Of Present Illness:    Patricia Pérez is a 19 y.o. female here with palpitations and lightheadedness.     Feels like metoprolol has helped. Does not feel the racing heart anymore. Sleeping well. Denies syncope.   Will feel fatigued due to metoprolol, but can tolerated that.     Past Medical History:  She has a past medical history of Allergic and Depression (2018).    Past Surgical History:  She has no past surgical history on file.      Social History:  She reports that she has quit smoking. Her smoking use included cigarettes. She has never used smokeless tobacco. She reports current alcohol use of about 2.0 standard drinks of alcohol per week. She reports that she does not use drugs.    Family History:  Family History   Problem Relation Name Age of Onset    Depression Mother Kayla     Mental illness Mother Kayla     Depression Father Fabio         Allergies:  Penicillins    Review of Systems  All pertinent systems have been reviewed and are negative except for what is stated in the history of present illness.    All other systems have been reviewed and are negative and noncontributory to this patient's current ailments.     Visit Vitals  BP 95/53 (BP Location: Right arm, Patient Position: Sitting, BP Cuff Size: Adult)   Pulse 91   Wt 57.6 kg (127 lb)   BMI 19.89 kg/m²   OB Status Having periods   Smoking Status Former   BSA 1.65 m²         Last Labs:  CBC -  Lab Results   Component Value Date    WBC 13.1 (H) 12/21/2023    HGB 14.4 12/21/2023    HCT 44.2 12/21/2023    MCV 94 12/21/2023     12/21/2023       CMP -  Lab Results   Component Value Date    CALCIUM 9.5 12/21/2023    PROT 7.0 12/21/2023    ALBUMIN 4.6 12/21/2023    AST 16 12/21/2023    ALT 15 12/21/2023    ALKPHOS 64 12/21/2023    BILITOT 0.6 12/21/2023    BUN 13 12/21/2023    CREATININE 0.60 12/21/2023       LIPID PANEL -   No results found for: \"CHOL\", \"TRIG\", \"HDL\", \"CHHDL\", " "\"LDLF\", \"VLDL\", \"NHDL\"    RENAL FUNCTION PANEL -   Lab Results   Component Value Date    GLUCOSE 76 12/21/2023     12/21/2023    K 4.3 12/21/2023     12/21/2023    CO2 30 12/21/2023    ANIONGAP 8 (L) 12/21/2023    BUN 13 12/21/2023    CREATININE 0.60 12/21/2023    CALCIUM 9.5 12/21/2023    ALBUMIN 4.6 12/21/2023        No results found for: \"BNP\", \"HGBA1C\"    Objective   Vitals reviewed.   Constitutional:       Appearance: Healthy appearance. Not in distress.   Eyes:      Conjunctiva/sclera: Conjunctivae normal.   Neck:      Vascular: No JVR. JVD normal.   Pulmonary:      Effort: Pulmonary effort is normal.      Breath sounds: Normal breath sounds. No wheezing. No rhonchi. No rales.   Chest:      Chest wall: Not tender to palpatation.   Cardiovascular:      PMI at left midclavicular line. Normal rate. Regular rhythm. Normal S1. Normal S2.       Murmurs: There is no murmur.      No gallop.  No click. No rub.   Edema:     Peripheral edema absent.   Abdominal:      General: Bowel sounds are normal.      Palpations: Abdomen is soft.      Tenderness: There is no abdominal tenderness.   Musculoskeletal: Normal range of motion.         General: No tenderness. Skin:     General: Skin is warm and dry.   Neurological:      General: No focal deficit present.      Mental Status: Alert and oriented to person, place and time.   Psychiatric:         Attention and Perception: Attention normal.         Mood and Affect: Mood normal.       Assessment/Plan   Diagnoses and all orders for this visit:  Tachycardia  - Orthos negative for POTs   - can always pursue tilt table test of necessary   - tolerating metoprolol succinate XL (Toprol XL) 25 mg 24 hr tablet; Take 1 tablet (25 mg) by mouth once daily. Do not crush or chew.  - heart rate well controlled, improved with metoprolol  - symptoms well managed   Heart palpitations  - improved  - nothing untoward on monitor   - continue increased hydration and sodium   - well " controlled with metoprolol  Intermittent lightheadedness  - discussed hydration   Syncope and collapse  - resolved    Follow up annually     Current Outpatient Medications:     escitalopram (Lexapro) 5 mg tablet, Take 1 tablet (5 mg) by mouth once daily., Disp: 30 tablet, Rfl: 2    metoprolol succinate XL (Toprol XL) 25 mg 24 hr tablet, Take 1 tablet (25 mg) by mouth once daily. Do not crush or chew., Disp: 90 tablet, Rfl: 3    Exclusive of any other services or procedures performed, I, Naye WADE, spent 45 minutes in duration for this visit today.  This time consisted of chart review, obtaining history, and/or performing the exam as documented above, as well as, documenting the clinical information for the encounter in the electronic record, discussing treatment options, plans, and/or goals with patient, family, and/or caregiver, refilling medications, updating the electronic record, ordering medicines, lab work, imaging, referrals, and/or procedures as documented above and communicating with other [facility] professionals. I have discussed the results of laboratory, radiology, and cardiology studies with the patient and their family/caregiver.

## 2024-10-02 ENCOUNTER — APPOINTMENT (OUTPATIENT)
Dept: PRIMARY CARE | Facility: CLINIC | Age: 19
End: 2024-10-02
Payer: MEDICAID

## 2025-01-06 ENCOUNTER — APPOINTMENT (OUTPATIENT)
Dept: PRIMARY CARE | Facility: CLINIC | Age: 20
End: 2025-01-06
Payer: MEDICAID

## 2025-01-07 ENCOUNTER — APPOINTMENT (OUTPATIENT)
Dept: PRIMARY CARE | Facility: CLINIC | Age: 20
End: 2025-01-07
Payer: MEDICAID

## 2025-01-07 VITALS
OXYGEN SATURATION: 94 % | SYSTOLIC BLOOD PRESSURE: 109 MMHG | RESPIRATION RATE: 16 BRPM | HEIGHT: 67 IN | DIASTOLIC BLOOD PRESSURE: 71 MMHG | BODY MASS INDEX: 20.56 KG/M2 | TEMPERATURE: 96.9 F | HEART RATE: 75 BPM | WEIGHT: 131 LBS

## 2025-01-07 DIAGNOSIS — F41.9 ANXIETY: ICD-10-CM

## 2025-01-07 DIAGNOSIS — F41.8 DEPRESSION WITH ANXIETY: Primary | ICD-10-CM

## 2025-01-07 DIAGNOSIS — R00.2 HEART PALPITATIONS: ICD-10-CM

## 2025-01-07 PROCEDURE — 3008F BODY MASS INDEX DOCD: CPT | Performed by: STUDENT IN AN ORGANIZED HEALTH CARE EDUCATION/TRAINING PROGRAM

## 2025-01-07 PROCEDURE — 99213 OFFICE O/P EST LOW 20 MIN: CPT | Performed by: STUDENT IN AN ORGANIZED HEALTH CARE EDUCATION/TRAINING PROGRAM

## 2025-01-07 PROCEDURE — 1036F TOBACCO NON-USER: CPT | Performed by: STUDENT IN AN ORGANIZED HEALTH CARE EDUCATION/TRAINING PROGRAM

## 2025-01-07 RX ORDER — ESCITALOPRAM OXALATE 5 MG/1
5 TABLET ORAL DAILY
Qty: 30 TABLET | Refills: 5 | Status: SHIPPED | OUTPATIENT
Start: 2025-01-07 | End: 2025-07-06

## 2025-01-07 SDOH — ECONOMIC STABILITY: FOOD INSECURITY: WITHIN THE PAST 12 MONTHS, YOU WORRIED THAT YOUR FOOD WOULD RUN OUT BEFORE YOU GOT MONEY TO BUY MORE.: NEVER TRUE

## 2025-01-07 SDOH — ECONOMIC STABILITY: FOOD INSECURITY: WITHIN THE PAST 12 MONTHS, THE FOOD YOU BOUGHT JUST DIDN'T LAST AND YOU DIDN'T HAVE MONEY TO GET MORE.: NEVER TRUE

## 2025-01-07 ASSESSMENT — ENCOUNTER SYMPTOMS
FATIGUE: 0
CONSTIPATION: 0
FEVER: 0
NAUSEA: 0
ABDOMINAL PAIN: 0
CONFUSION: 0
VOMITING: 0
DIZZINESS: 0
WHEEZING: 0
DIARRHEA: 0
CHILLS: 0
COUGH: 0
PALPITATIONS: 0
NERVOUS/ANXIOUS: 0
SLEEP DISTURBANCE: 0
HYPERACTIVE: 0
SHORTNESS OF BREATH: 0
MUSCULOSKELETAL NEGATIVE: 1
COLOR CHANGE: 0
UNEXPECTED WEIGHT CHANGE: 0
HEADACHES: 0

## 2025-01-07 ASSESSMENT — ANXIETY QUESTIONNAIRES
1. FEELING NERVOUS, ANXIOUS, OR ON EDGE: NOT AT ALL
4. TROUBLE RELAXING: NOT AT ALL
6. BECOMING EASILY ANNOYED OR IRRITABLE: NOT AT ALL
7. FEELING AFRAID AS IF SOMETHING AWFUL MIGHT HAPPEN: NOT AT ALL
3. WORRYING TOO MUCH ABOUT DIFFERENT THINGS: NOT AT ALL
5. BEING SO RESTLESS THAT IT IS HARD TO SIT STILL: NOT AT ALL
2. NOT BEING ABLE TO STOP OR CONTROL WORRYING: NOT AT ALL
GAD7 TOTAL SCORE: 0

## 2025-01-07 ASSESSMENT — LIFESTYLE VARIABLES
HOW MANY STANDARD DRINKS CONTAINING ALCOHOL DO YOU HAVE ON A TYPICAL DAY: 1 OR 2
HOW OFTEN DO YOU HAVE A DRINK CONTAINING ALCOHOL: MONTHLY OR LESS
AUDIT-C TOTAL SCORE: 1
SKIP TO QUESTIONS 9-10: 1
HOW OFTEN DO YOU HAVE SIX OR MORE DRINKS ON ONE OCCASION: NEVER

## 2025-01-07 ASSESSMENT — PATIENT HEALTH QUESTIONNAIRE - PHQ9
1. LITTLE INTEREST OR PLEASURE IN DOING THINGS: SEVERAL DAYS
SUM OF ALL RESPONSES TO PHQ9 QUESTIONS 1 & 2: 2
2. FEELING DOWN, DEPRESSED OR HOPELESS: SEVERAL DAYS

## 2025-01-07 NOTE — PROGRESS NOTES
"Subjective   Patient ID: Patricia Pérez is a 19 y.o. female who presents for Follow-up (Follow up depression and anxiety, pt has been seeing a psychologist on campus and is working on anxiety).    HPI   She is here for follow up. Reports her mood been stable, taking lexapro 5 mg daily, working well. No SI/HI and or panic attacks. Also taking BB as usual for heart palpitation, keeping under control. She is also following with psychologist weekly on campus, going well there too.     Review of Systems   Constitutional:  Negative for chills, fatigue, fever and unexpected weight change.   HENT: Negative.     Respiratory:  Negative for cough, shortness of breath and wheezing.    Cardiovascular:  Negative for chest pain, palpitations and leg swelling.   Gastrointestinal:  Negative for abdominal pain, constipation, diarrhea, nausea and vomiting.   Musculoskeletal: Negative.    Skin:  Negative for color change and rash.   Neurological:  Negative for dizziness and headaches.   Psychiatric/Behavioral:  Negative for behavioral problems, confusion, self-injury, sleep disturbance and suicidal ideas. The patient is not nervous/anxious and is not hyperactive.        Objective   /71 (BP Location: Left arm, Patient Position: Sitting, BP Cuff Size: Adult)   Pulse 75   Temp 36.1 °C (96.9 °F) (Temporal)   Resp 16   Ht 1.702 m (5' 7\")   Wt 59.4 kg (131 lb)   SpO2 94%   BMI 20.52 kg/m²     Physical Exam  Vitals and nursing note reviewed.   Constitutional:       Appearance: Normal appearance.      Comments: Well-appearing young female with a multiple piercing on her face/lips   Cardiovascular:      Rate and Rhythm: Normal rate and regular rhythm.      Pulses: Normal pulses.      Heart sounds: Normal heart sounds.   Pulmonary:      Effort: Pulmonary effort is normal. No respiratory distress.      Breath sounds: Normal breath sounds.   Abdominal:      General: Abdomen is flat. Bowel sounds are normal.      Palpations: Abdomen is " soft.   Musculoskeletal:         General: Normal range of motion.   Neurological:      General: No focal deficit present.      Mental Status: She is alert and oriented to person, place, and time.   Psychiatric:         Mood and Affect: Mood normal.         Behavior: Behavior normal.         Thought Content: Thought content does not include homicidal or suicidal ideation.         Assessment/Plan   She is here for FU visit. Appears her anxiety/depression stable on lexapro 5 mg daily, will cont same. Cont following with therapist as usual on campus. Continue following home relaxation & mindfulness activities daily as deep breathing exercises, meditations, reading books, playing music, etc.   Cont BB, metoprolol as rx'd cardiology. She is otherwise clinically & vitally stable.     Problem List Items Addressed This Visit    None  Visit Diagnoses         Codes    Depression with anxiety    -  Primary F41.8    Relevant Medications    escitalopram (Lexapro) 5 mg tablet    Anxiety     F41.9    Heart palpitations     R00.2          Rtc 6 mo for HM/FU     Alpesh Quiñones MD   [facility], Family Medicine

## 2025-07-08 ENCOUNTER — APPOINTMENT (OUTPATIENT)
Dept: PRIMARY CARE | Facility: CLINIC | Age: 20
End: 2025-07-08
Payer: MEDICAID